# Patient Record
Sex: FEMALE | Race: WHITE | Employment: FULL TIME | URBAN - METROPOLITAN AREA
[De-identification: names, ages, dates, MRNs, and addresses within clinical notes are randomized per-mention and may not be internally consistent; named-entity substitution may affect disease eponyms.]

---

## 2017-03-04 ENCOUNTER — HOSPITAL ENCOUNTER (EMERGENCY)
Facility: HOSPITAL | Age: 37
Discharge: HOME | End: 2017-03-04
Attending: EMERGENCY MEDICINE
Payer: OTHER MISCELLANEOUS

## 2017-03-04 VITALS
DIASTOLIC BLOOD PRESSURE: 101 MMHG | RESPIRATION RATE: 18 BRPM | TEMPERATURE: 98 F | OXYGEN SATURATION: 98 % | HEART RATE: 86 BPM | BODY MASS INDEX: 26.43 KG/M2 | SYSTOLIC BLOOD PRESSURE: 141 MMHG | WEIGHT: 140 LBS | HEIGHT: 61 IN

## 2017-03-04 DIAGNOSIS — IMO0001 NEEDLE STICK INJURY, INITIAL ENCOUNTER: Primary | ICD-10-CM

## 2017-03-04 LAB
HBV SURFACE AB SER QL: REACTIVE
HBV SURFACE AB SERPL IA-ACNC: >1000 MIU/ML
HEPATITIS C VIRUS AB INTERPRETATION: NONREACTIVE
HIVS EXPOSURE ONLY: NONREACTIVE

## 2017-03-04 PROCEDURE — 99283 EMERGENCY DEPT VISIT LOW MDM: CPT

## 2017-03-04 PROCEDURE — 86803 HEPATITIS C AB TEST: CPT | Performed by: EMERGENCY MEDICINE

## 2017-03-04 PROCEDURE — 36415 COLL VENOUS BLD VENIPUNCTURE: CPT

## 2017-03-04 PROCEDURE — 86706 HEP B SURFACE ANTIBODY: CPT | Performed by: EMERGENCY MEDICINE

## 2017-03-04 PROCEDURE — 86703 HIV-1/HIV-2 1 RESULT ANTBDY: CPT | Performed by: EMERGENCY MEDICINE

## 2017-03-04 NOTE — ED INITIAL ASSESSMENT (HPI)
Pt presents to ER status post sticking herself with a needle after draining an abscess from a 14year old patient. Pt states that she believes her risk is low, but wanted an evaluation. Pt stuck herself on the right side of her head just below thumb.  Pt th

## 2017-03-04 NOTE — ED PROVIDER NOTES
Patient Seen in: [facility] Emergency Department    History   Patient presents with:  Exposure,Chem Occupational (infectious)    Stated Complaint: needle stick    HPI    48-year-old female who presents here to the emergency department after she was Alcohol Use: Yes           0.0 oz/week       0 Glasses of wine per week       Comment: pregnant      Review of Systems    Positive for stated complaint: needle stick  Other systems are as noted in HPI. Constitutional and vital signs reviewed. Medication List

## 2017-03-23 PROBLEM — F41.9 MILD ANXIETY: Status: ACTIVE | Noted: 2017-03-23

## 2017-04-05 PROBLEM — M65.4 DE QUERVAIN'S TENOSYNOVITIS, BILATERAL: Status: ACTIVE | Noted: 2017-04-05

## 2017-06-21 PROBLEM — M65.4 DE QUERVAIN'S TENOSYNOVITIS, RIGHT: Status: ACTIVE | Noted: 2017-06-21

## 2017-11-30 PROBLEM — M65.4 DE QUERVAIN'S TENOSYNOVITIS, BILATERAL: Status: RESOLVED | Noted: 2017-04-05 | Resolved: 2017-11-30

## 2017-11-30 PROBLEM — M65.4 DE QUERVAIN'S TENOSYNOVITIS, RIGHT: Status: RESOLVED | Noted: 2017-06-21 | Resolved: 2017-11-30

## 2018-08-21 PROCEDURE — 86704 HEP B CORE ANTIBODY TOTAL: CPT | Performed by: OBSTETRICS & GYNECOLOGY

## 2018-08-21 PROCEDURE — 82390 ASSAY OF CERULOPLASMIN: CPT | Performed by: OBSTETRICS & GYNECOLOGY

## 2018-08-21 PROCEDURE — 86480 TB TEST CELL IMMUN MEASURE: CPT | Performed by: PHYSICIAN ASSISTANT

## 2018-08-21 PROCEDURE — 87340 HEPATITIS B SURFACE AG IA: CPT | Performed by: OBSTETRICS & GYNECOLOGY

## 2018-08-21 PROCEDURE — 86256 FLUORESCENT ANTIBODY TITER: CPT | Performed by: INTERNAL MEDICINE

## 2018-08-21 PROCEDURE — 83516 IMMUNOASSAY NONANTIBODY: CPT | Performed by: INTERNAL MEDICINE

## 2018-08-21 PROCEDURE — 84144 ASSAY OF PROGESTERONE: CPT | Performed by: OBSTETRICS & GYNECOLOGY

## 2018-08-21 PROCEDURE — 86803 HEPATITIS C AB TEST: CPT | Performed by: OBSTETRICS & GYNECOLOGY

## 2018-11-01 PROBLEM — O09.529 ADVANCED MATERNAL AGE IN MULTIGRAVIDA, UNSPECIFIED TRIMESTER: Status: ACTIVE | Noted: 2018-11-01

## 2018-11-01 PROBLEM — O09.899 H/O PRETERM DELIVERY, CURRENTLY PREGNANT: Status: ACTIVE | Noted: 2018-11-01

## 2018-11-01 PROBLEM — Z34.81 PRENATAL CARE, SUBSEQUENT PREGNANCY, FIRST TRIMESTER: Status: ACTIVE | Noted: 2018-11-01

## 2018-11-16 PROCEDURE — 86850 RBC ANTIBODY SCREEN: CPT | Performed by: OBSTETRICS & GYNECOLOGY

## 2018-11-16 PROCEDURE — 86762 RUBELLA ANTIBODY: CPT | Performed by: OBSTETRICS & GYNECOLOGY

## 2018-11-16 PROCEDURE — 86900 BLOOD TYPING SEROLOGIC ABO: CPT | Performed by: OBSTETRICS & GYNECOLOGY

## 2018-11-16 PROCEDURE — 86780 TREPONEMA PALLIDUM: CPT | Performed by: OBSTETRICS & GYNECOLOGY

## 2018-11-16 PROCEDURE — 87389 HIV-1 AG W/HIV-1&-2 AB AG IA: CPT | Performed by: OBSTETRICS & GYNECOLOGY

## 2018-11-16 PROCEDURE — 86901 BLOOD TYPING SEROLOGIC RH(D): CPT | Performed by: OBSTETRICS & GYNECOLOGY

## 2019-01-17 NOTE — PROGRESS NOTES
Outpatient Maternal-Fetal Medicine Consultation    Dear Dr. Oliva Flores,    Thank you for requesting ultrasound evaluation and maternal fetal medicine consultation on your patient Jocelyn Montero.   As you are aware she is a 43year old female with a [place] pr Oral Tab, Take 1 tablet by mouth daily. , Disp: , Rfl:   •  doxylamine-pyridoxine 10-10 MG Oral Tab EC, Take 2 tablets by mouth daily. , Disp: 180 tablet, Rfl: 1  Allergies: No Known Allergies      PHYSICAL EXAMINATION:  /85   Pulse 82   Ht 5' 1\" (1.5 wall, gastrointestinal tract, kidneys, bladder, extremities, skeleton.     Brain: Visualized and normal appearance: brain parenchyma, cerebral ventricles, choroid plexus, Cisterna Magna, midline falx, cerebellum, cerebellar lobes, posterior fossa, vermis, c malformations  · Preeclampsia  · Gestational diabetes  · Intrauterine fetal death    As a result, enhanced pregnancy surveillance is advised for these patients including a comprehensive ultrasound to assess for fetal malformations (at 20 weeks) and a third weeks of gestation would drop the risk of fetal death from 5.2 to 1.3 per 1000 pregnancies.  While a policy of antepartum testing in older women does increase the chance that a women will be induced (71 inductions per fetal death averted) and thereby increa high-risk patients. The limitations of detailed mid-trimester sonography was reviewed with the patient. First trimester screening and second trimester multiple-marker serum serum screening as alternative aneuploidy screening options were also reviewed.  How

## 2019-01-24 ENCOUNTER — OFFICE VISIT (OUTPATIENT)
Dept: PERINATAL CARE | Facility: HOSPITAL | Age: 39
End: 2019-01-24
Attending: OBSTETRICS & GYNECOLOGY
Payer: COMMERCIAL

## 2019-01-24 VITALS
WEIGHT: 142 LBS | DIASTOLIC BLOOD PRESSURE: 85 MMHG | BODY MASS INDEX: 26.81 KG/M2 | HEART RATE: 82 BPM | HEIGHT: 61 IN | SYSTOLIC BLOOD PRESSURE: 126 MMHG

## 2019-01-24 DIAGNOSIS — O09.529 ADVANCED MATERNAL AGE IN MULTIGRAVIDA, UNSPECIFIED TRIMESTER: ICD-10-CM

## 2019-01-24 DIAGNOSIS — O09.899 H/O PRETERM DELIVERY, CURRENTLY PREGNANT: ICD-10-CM

## 2019-01-24 PROCEDURE — 76811 OB US DETAILED SNGL FETUS: CPT | Performed by: OBSTETRICS & GYNECOLOGY

## 2019-01-24 PROCEDURE — 99242 OFF/OP CONSLTJ NEW/EST SF 20: CPT | Performed by: OBSTETRICS & GYNECOLOGY

## 2019-01-24 PROCEDURE — 87086 URINE CULTURE/COLONY COUNT: CPT | Performed by: OBSTETRICS & GYNECOLOGY

## 2019-03-08 PROCEDURE — 87389 HIV-1 AG W/HIV-1&-2 AB AG IA: CPT | Performed by: OBSTETRICS & GYNECOLOGY

## 2019-03-11 PROBLEM — O24.410 DIET CONTROLLED GESTATIONAL DIABETES MELLITUS (GDM) IN THIRD TRIMESTER: Status: ACTIVE | Noted: 2019-03-11

## 2019-04-24 NOTE — PROGRESS NOTES
Outpatient Maternal-Fetal Medicine Consultation    Dear Dr. Carmella Roche    Thank you for requesting ultrasound evaluation and maternal fetal medicine consultation on your patient Jocelyn Montero.   As you are aware she is a 43year old female  with a single 1  •  escitalopram 10 MG Oral Tab, Take 1 tablet (10 mg total) by mouth daily. , Disp: 90 tablet, Rfl: 1  •  RaNITidine HCl 150 MG Oral Cap, Take 150 mg by mouth 2 (two) times daily. , Disp: , Rfl:   •  doxylamine-pyridoxine 10-10 MG Oral Tab EC, Take 2 tabl kidneys, bladder. Summary of Ultrasound Findings:  Impression: The fetal growth is appropriate    ____________________________________________________________________________  Fetal Wellbeing Assessment:  Amniotic fluid: normal. EDGARDO: 15.6 cm.  MVP: 6.2 for weekly MFM review. Insulin therapy may be started depending on her blood sugar levels.     Her capillary blood glucose records were reviewed today; her compliance with the recommended four times daily assessments (fasting and two-hour post-prandial) is

## 2019-04-25 ENCOUNTER — OFFICE VISIT (OUTPATIENT)
Dept: PERINATAL CARE | Facility: HOSPITAL | Age: 39
End: 2019-04-25
Attending: OBSTETRICS & GYNECOLOGY
Payer: COMMERCIAL

## 2019-04-25 VITALS
DIASTOLIC BLOOD PRESSURE: 85 MMHG | SYSTOLIC BLOOD PRESSURE: 130 MMHG | HEART RATE: 106 BPM | BODY MASS INDEX: 28 KG/M2 | WEIGHT: 151 LBS

## 2019-04-25 DIAGNOSIS — O24.410 DIET CONTROLLED GESTATIONAL DIABETES MELLITUS (GDM) IN THIRD TRIMESTER: ICD-10-CM

## 2019-04-25 DIAGNOSIS — O09.529 ADVANCED MATERNAL AGE IN MULTIGRAVIDA, UNSPECIFIED TRIMESTER: ICD-10-CM

## 2019-04-25 PROCEDURE — 99242 OFF/OP CONSLTJ NEW/EST SF 20: CPT | Performed by: OBSTETRICS & GYNECOLOGY

## 2019-04-25 PROCEDURE — 76805 OB US >/= 14 WKS SNGL FETUS: CPT | Performed by: OBSTETRICS & GYNECOLOGY

## 2019-04-25 PROCEDURE — 76819 FETAL BIOPHYS PROFIL W/O NST: CPT | Performed by: OBSTETRICS & GYNECOLOGY

## 2019-05-02 PROCEDURE — 87086 URINE CULTURE/COLONY COUNT: CPT | Performed by: OBSTETRICS & GYNECOLOGY

## 2019-05-16 ENCOUNTER — HOSPITAL ENCOUNTER (INPATIENT)
Facility: HOSPITAL | Age: 39
LOS: 3 days | Discharge: HOME | End: 2019-05-19
Attending: OBSTETRICS & GYNECOLOGY | Admitting: OBSTETRICS & GYNECOLOGY
Payer: COMMERCIAL

## 2019-05-16 PROBLEM — Z34.90 PREGNANCY: Status: ACTIVE | Noted: 2019-05-16

## 2019-05-16 PROCEDURE — 86901 BLOOD TYPING SEROLOGIC RH(D): CPT | Performed by: OBSTETRICS & GYNECOLOGY

## 2019-05-16 PROCEDURE — 86850 RBC ANTIBODY SCREEN: CPT | Performed by: OBSTETRICS & GYNECOLOGY

## 2019-05-16 PROCEDURE — 86780 TREPONEMA PALLIDUM: CPT | Performed by: OBSTETRICS & GYNECOLOGY

## 2019-05-16 PROCEDURE — 86900 BLOOD TYPING SEROLOGIC ABO: CPT | Performed by: OBSTETRICS & GYNECOLOGY

## 2019-05-16 PROCEDURE — 85027 COMPLETE CBC AUTOMATED: CPT | Performed by: OBSTETRICS & GYNECOLOGY

## 2019-05-16 PROCEDURE — 99214 OFFICE O/P EST MOD 30 MIN: CPT

## 2019-05-16 RX ORDER — SODIUM CHLORIDE, SODIUM LACTATE, POTASSIUM CHLORIDE, CALCIUM CHLORIDE 600; 310; 30; 20 MG/100ML; MG/100ML; MG/100ML; MG/100ML
INJECTION, SOLUTION INTRAVENOUS CONTINUOUS
Status: DISCONTINUED | OUTPATIENT
Start: 2019-05-16 | End: 2019-05-17

## 2019-05-16 RX ORDER — TERBUTALINE SULFATE 1 MG/ML
0.25 INJECTION, SOLUTION SUBCUTANEOUS AS NEEDED
Status: DISCONTINUED | OUTPATIENT
Start: 2019-05-16 | End: 2019-05-17

## 2019-05-16 RX ORDER — EPHEDRINE SULFATE/0.9% NACL/PF 25 MG/5 ML
5 SYRINGE (ML) INTRAVENOUS AS NEEDED
Status: DISCONTINUED | OUTPATIENT
Start: 2019-05-16 | End: 2019-05-17

## 2019-05-16 RX ORDER — DEXTROSE, SODIUM CHLORIDE, SODIUM LACTATE, POTASSIUM CHLORIDE, AND CALCIUM CHLORIDE 5; .6; .31; .03; .02 G/100ML; G/100ML; G/100ML; G/100ML; G/100ML
INJECTION, SOLUTION INTRAVENOUS AS NEEDED
Status: DISCONTINUED | OUTPATIENT
Start: 2019-05-16 | End: 2019-05-17

## 2019-05-16 RX ORDER — NALBUPHINE HCL 10 MG/ML
2.5 AMPUL (ML) INJECTION
Status: DISCONTINUED | OUTPATIENT
Start: 2019-05-16 | End: 2019-05-17

## 2019-05-16 RX ORDER — AMMONIA INHALANTS 0.04 G/.3ML
0.3 INHALANT RESPIRATORY (INHALATION) AS NEEDED
Status: DISCONTINUED | OUTPATIENT
Start: 2019-05-16 | End: 2019-05-17

## 2019-05-16 RX ORDER — IBUPROFEN 600 MG/1
600 TABLET ORAL ONCE AS NEEDED
Status: DISCONTINUED | OUTPATIENT
Start: 2019-05-16 | End: 2019-05-17

## 2019-05-16 RX ORDER — TRISODIUM CITRATE DIHYDRATE AND CITRIC ACID MONOHYDRATE 500; 334 MG/5ML; MG/5ML
30 SOLUTION ORAL AS NEEDED
Status: DISCONTINUED | OUTPATIENT
Start: 2019-05-16 | End: 2019-05-17

## 2019-05-17 PROCEDURE — S0028 INJECTION, FAMOTIDINE, 20 MG: HCPCS | Performed by: OBSTETRICS & GYNECOLOGY

## 2019-05-17 PROCEDURE — 0KQM0ZZ REPAIR PERINEUM MUSCLE, OPEN APPROACH: ICD-10-PCS | Performed by: OBSTETRICS & GYNECOLOGY

## 2019-05-17 RX ORDER — BISACODYL 10 MG
10 SUPPOSITORY, RECTAL RECTAL ONCE AS NEEDED
Status: ACTIVE | OUTPATIENT
Start: 2019-05-17 | End: 2019-05-17

## 2019-05-17 RX ORDER — ONDANSETRON 2 MG/ML
4 INJECTION INTRAMUSCULAR; INTRAVENOUS EVERY 6 HOURS PRN
Status: DISCONTINUED | OUTPATIENT
Start: 2019-05-17 | End: 2019-05-17

## 2019-05-17 RX ORDER — IBUPROFEN 600 MG/1
600 TABLET ORAL EVERY 6 HOURS
Status: DISCONTINUED | OUTPATIENT
Start: 2019-05-17 | End: 2019-05-19

## 2019-05-17 RX ORDER — ZOLPIDEM TARTRATE 5 MG/1
5 TABLET ORAL NIGHTLY PRN
Status: DISCONTINUED | OUTPATIENT
Start: 2019-05-17 | End: 2019-05-19

## 2019-05-17 RX ORDER — SIMETHICONE 80 MG
80 TABLET,CHEWABLE ORAL 3 TIMES DAILY PRN
Status: DISCONTINUED | OUTPATIENT
Start: 2019-05-17 | End: 2019-05-19

## 2019-05-17 RX ORDER — DOCUSATE SODIUM 100 MG/1
100 CAPSULE, LIQUID FILLED ORAL
Status: DISCONTINUED | OUTPATIENT
Start: 2019-05-17 | End: 2019-05-19

## 2019-05-17 RX ORDER — ACETAMINOPHEN 325 MG/1
650 TABLET ORAL EVERY 6 HOURS PRN
Status: DISCONTINUED | OUTPATIENT
Start: 2019-05-17 | End: 2019-05-19

## 2019-05-17 RX ORDER — FAMOTIDINE 10 MG/ML
20 INJECTION, SOLUTION INTRAVENOUS 2 TIMES DAILY
Status: DISCONTINUED | OUTPATIENT
Start: 2019-05-17 | End: 2019-05-17

## 2019-05-17 NOTE — PROGRESS NOTES
SUBJECTIVE:   pt without complaints. Comfortable s/p epidural    OBJECTIVE:  VS:  height is 5' 1\" (1.549 m) and weight is 152 lb (68.9 kg). Her temporal temperature is 97.9 °F (36.6 °C). Her blood pressure is 113/74 and her pulse is 91.  Her respiration i

## 2019-05-17 NOTE — PLAN OF CARE
Problem: SAFETY ADULT - FALL  Goal: Free from fall injury  Description  INTERVENTIONS:  - Assess pt frequently for physical needs  - Identify cognitive and physical deficits and behaviors that affect risk of falls.   - [place] fall precautions as indica previous experience with breast feeding.  - Provide information as needed about early infant feeding cues (e.g., rooting, lip smacking, sucking fingers/hand) versus late cue of crying.  - Discuss/demonstrate breast feeding aids (e.g., infant sling, nursing

## 2019-05-17 NOTE — PROGRESS NOTES
Patient arrived via    to Mother/Baby unit. Pt transferred to bed. Oriented to surroundings. Call light within reach. Siderails up x2. Baby bands verified and correct. Hugs & Kisses already on and explained. .  Baby in room with christin

## 2019-05-17 NOTE — PLAN OF CARE
Problem: SAFETY ADULT - FALL  Goal: Free from fall injury  Description  INTERVENTIONS:  - Assess pt frequently for physical needs  - Identify cognitive and physical deficits and behaviors that affect risk of falls.   - [place] fall precautions as indica

## 2019-05-17 NOTE — L&D DELIVERY NOTE
Aranza Clayton, Girl Thyra Oh [[id number]]    Labor Events     labor?:  Yes   steroids?:  None  Antibiotics received during labor?:  Yes  Antibiotics (enter # doses in comment):  ampicillin  Rupture date/time:  2019 0703     Rupture type:  AROM, I withdrawal    Muscle tone Limp Some flexion Active motion    Respiratory effort Absent Weak cry; hypoventilation Good, crying              1 Minute:   5 Minute:   10 Minute:   15 Minute:   20 Minute:     Skin color: 0  1       Heart rate: 2  2       Reflex

## 2019-05-17 NOTE — H&P
[address] and Delivery Prenatal History and Physical Interval Addendum  Please see full Prenatal Record for this pregnancy      SUBJECTIVE:    Interval History: This is a pregnancy at 36w2d weeks admitted for painful contractions in labor.

## 2019-05-18 PROCEDURE — 85025 COMPLETE CBC W/AUTO DIFF WBC: CPT | Performed by: OBSTETRICS & GYNECOLOGY

## 2019-05-18 RX ORDER — ESCITALOPRAM OXALATE 10 MG/1
10 TABLET ORAL DAILY
Status: DISCONTINUED | OUTPATIENT
Start: 2019-05-18 | End: 2019-05-19

## 2019-05-18 RX ORDER — RANITIDINE 15 MG/ML
150 SOLUTION ORAL DAILY
Status: DISCONTINUED | OUTPATIENT
Start: 2019-05-18 | End: 2019-05-19

## 2019-05-18 NOTE — PLAN OF CARE
Problem: SAFETY ADULT - FALL  Goal: Free from fall injury  Description  INTERVENTIONS:  - Assess pt frequently for physical needs  - Identify cognitive and physical deficits and behaviors that affect risk of falls.   - [place] fall precautions as indica previous experience with breast feeding.  - Provide information as needed about early infant feeding cues (e.g., rooting, lip smacking, sucking fingers/hand) versus late cue of crying.  - Discuss/demonstrate breast feeding aids (e.g., infant sling, nursing

## 2019-05-18 NOTE — PROGRESS NOTES
Postpartum Day 1    Pt without complaints.     Temp: 98.6 °F (37 °C)  Pulse: 86  Resp: 16  BP: 125/99  abd  soft, NT, ND, fundus firm below umbilicus  perineum NL lochia  extr  trace edema, no calf tenderness  Recent Labs   Lab 05/18/19  0802   RBC 3.83   H

## 2019-05-19 VITALS
HEART RATE: 76 BPM | TEMPERATURE: 98 F | SYSTOLIC BLOOD PRESSURE: 128 MMHG | WEIGHT: 152 LBS | OXYGEN SATURATION: 100 % | HEIGHT: 61 IN | DIASTOLIC BLOOD PRESSURE: 93 MMHG | RESPIRATION RATE: 16 BRPM | BODY MASS INDEX: 28.7 KG/M2

## 2019-05-19 NOTE — PROGRESS NOTES
NURSING DISCHARGE NOTE    Discharged Home via ambulatory. Accompanied by Support staff  Belongings Taken by patient/family. Discharge instructions reviewed, pt states understanding.

## 2019-05-19 NOTE — PROGRESS NOTES
Postpartum Day 2    Pt without complaints.     Temp: 98.3 °F (36.8 °C)  Pulse: 76  Resp: 16  BP: 128/93  abd  soft, NT, ND, fundus firm below umbilicus  perineum NL lochia  extr  trace edema, no calf tenderness    Impression: PPD#2, h/o GDM  Plan:  Home tod

## 2019-05-19 NOTE — PLAN OF CARE
Problem: SAFETY ADULT - FALL  Goal: Free from fall injury  Description  INTERVENTIONS:  - Assess pt frequently for physical needs  - Identify cognitive and physical deficits and behaviors that affect risk of falls.   - [place] fall precautions as indica previous experience with breast feeding.  - Provide information as needed about early infant feeding cues (e.g., rooting, lip smacking, sucking fingers/hand) versus late cue of crying.  - Discuss/demonstrate breast feeding aids (e.g., infant sling, nursing

## 2019-05-21 ENCOUNTER — TELEPHONE (OUTPATIENT)
Dept: OBGYN UNIT | Facility: HOSPITAL | Age: 39
End: 2019-05-21

## 2019-08-28 PROBLEM — Z34.90 PREGNANCY: Status: RESOLVED | Noted: 2019-05-16 | Resolved: 2019-08-28

## 2019-08-28 PROBLEM — O09.529 ADVANCED MATERNAL AGE IN MULTIGRAVIDA, UNSPECIFIED TRIMESTER: Status: RESOLVED | Noted: 2018-11-01 | Resolved: 2019-08-28

## 2019-08-28 PROBLEM — Z34.81 PRENATAL CARE, SUBSEQUENT PREGNANCY, FIRST TRIMESTER: Status: RESOLVED | Noted: 2018-11-01 | Resolved: 2019-08-28

## 2019-08-28 PROBLEM — O09.899 H/O PRETERM DELIVERY, CURRENTLY PREGNANT: Status: RESOLVED | Noted: 2018-11-01 | Resolved: 2019-08-28

## 2020-03-12 ENCOUNTER — APPOINTMENT (OUTPATIENT)
Dept: OTHER | Facility: HOSPITAL | Age: 40
End: 2020-03-12
Attending: PREVENTIVE MEDICINE

## 2020-12-19 ENCOUNTER — IMMUNIZATION (OUTPATIENT)
Dept: LAB | Facility: HOSPITAL | Age: 40
End: 2020-12-19
Attending: PREVENTIVE MEDICINE
Payer: COMMERCIAL

## 2020-12-19 DIAGNOSIS — Z23 NEED FOR VACCINATION: ICD-10-CM

## 2020-12-19 PROCEDURE — 0001A PFIZER-BIONTECH COVID-19 VACCINE: CPT

## 2021-01-09 ENCOUNTER — IMMUNIZATION (OUTPATIENT)
Dept: LAB | Facility: HOSPITAL | Age: 41
End: 2021-01-09
Attending: PREVENTIVE MEDICINE
Payer: COMMERCIAL

## 2021-01-09 DIAGNOSIS — Z23 NEED FOR VACCINATION: ICD-10-CM

## 2021-01-09 PROCEDURE — 0002A SARSCOV2 VAC 30MCG/0.3ML IM: CPT

## 2021-03-10 ENCOUNTER — LAB REQUISITION (OUTPATIENT)
Dept: LAB | Facility: HOSPITAL | Age: 41
End: 2021-03-10
Payer: COMMERCIAL

## 2021-03-10 DIAGNOSIS — R22.32 LOCALIZED SWELLING, MASS AND LUMP, LEFT UPPER LIMB: ICD-10-CM

## 2021-03-10 PROCEDURE — 88304 TISSUE EXAM BY PATHOLOGIST: CPT | Performed by: ORTHOPAEDIC SURGERY

## 2023-01-05 ENCOUNTER — APPOINTMENT (OUTPATIENT)
Dept: URBAN - METROPOLITAN AREA CLINIC 208 | Age: 43
Setting detail: DERMATOLOGY
End: 2023-01-12

## 2023-01-05 DIAGNOSIS — L30.1 DYSHIDROSIS [POMPHOLYX]: ICD-10-CM

## 2023-01-05 PROCEDURE — OTHER PRESCRIPTION: OTHER

## 2023-01-05 PROCEDURE — OTHER MIPS QUALITY: OTHER

## 2023-01-05 PROCEDURE — OTHER COUNSELING: OTHER

## 2023-01-05 PROCEDURE — 99204 OFFICE O/P NEW MOD 45 MIN: CPT

## 2023-01-05 PROCEDURE — OTHER PRESCRIPTION SAMPLES PROVIDED: OTHER

## 2023-01-05 PROCEDURE — OTHER PATIENT SPECIFIC COUNSELING: OTHER

## 2023-01-05 RX ORDER — TACROLIMUS 1 MG/G
OINTMENT TOPICAL
Qty: 60 | Refills: 3 | Status: ERX | COMMUNITY
Start: 2023-01-05

## 2023-01-05 RX ORDER — HALOBETASOL PROPIONATE 0.1 MG/G
LOTION TOPICAL
Qty: 60 | Refills: 1 | Status: ERX | COMMUNITY
Start: 2023-01-05

## 2023-01-05 ASSESSMENT — LOCATION DETAILED DESCRIPTION DERM
LOCATION DETAILED: RIGHT RADIAL PALM
LOCATION DETAILED: LEFT RADIAL PALM
LOCATION DETAILED: RIGHT MEDIAL PLANTAR MIDFOOT

## 2023-01-05 ASSESSMENT — LOCATION SIMPLE DESCRIPTION DERM
LOCATION SIMPLE: RIGHT HAND
LOCATION SIMPLE: RIGHT PLANTAR SURFACE
LOCATION SIMPLE: LEFT HAND

## 2023-01-05 ASSESSMENT — LOCATION ZONE DERM
LOCATION ZONE: HAND
LOCATION ZONE: FEET

## 2023-01-05 NOTE — PROCEDURE: MIPS QUALITY
Quality 130: Documentation Of Current Medications In The Medical Record: Current Medications Documented
Detail Level: Generalized
Quality 110: Preventive Care And Screening: Influenza Immunization: Influenza Immunization Administered during Influenza season
Quality 226: Preventive Care And Screening: Tobacco Use: Screening And Cessation Intervention: Tobacco Screening not Performed for Medical Reasons

## 2023-01-05 NOTE — HPI: RASH
What Type Of Note Output Would You Prefer (Optional)?: Bullet Format
How Severe Is Your Rash?: moderate
Is This A New Presentation, Or A Follow-Up?: Rash
Additional History: Pt states she has tried TMC with some improvement but rash came back\\nTried Mometasone with much improvement, rash returned eventually and lastly Clobetasol, not sure if it helped much.\\nShe moisturizes with Aquaphor or CeraVe. 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

## 2023-01-19 ENCOUNTER — RX ONLY (RX ONLY)
Age: 43
End: 2023-01-19

## 2023-01-19 RX ORDER — TACROLIMUS 1 MG/G
OINTMENT TOPICAL
Qty: 60 | Refills: 3 | Status: ERX

## (undated) NOTE — ED AVS SNAPSHOT
[facility] Emergency Department    [place]  [address] [address]    Phone:  [phone]    Fax:  [phone]           Jose Francisco Avalos   MRN: [id number]    Department:  [facility] Emergency Department   Date of Visit:  3/4/2 If you have any problems with your follow-up, please call our  at [phone]    Si usted tiene algun problema con burns sequimiento, por favor llame a nuestro adminstrador de casos al [phone]    Expect to receive an electronic request Jazzy Rocha [address] [facility] ([address]) [address] [facility]. ([address]) [address]  ([address]) [address] [facility] Summaries. If you've been to the Emergency Department or your doctor's office, you can view your past visit information in [employer] by going to Visits < Visit Summaries. [employer] questions? Call [phone] for help.   [employer] is NOT to be used for urge

## (undated) NOTE — ED AVS SNAPSHOT
[facility] Emergency Department    [place]  [address] [address]    Phone:  [phone]    Fax:  [phone]           Gregg Prak   MRN: [id number]    Department:  [facility] Emergency Department   Date of Visit:  3/4/2 IF THERE IS ANY CHANGE OR WORSENING OF YOUR CONDITION, CALL YOUR PRIMARY CARE PHYSICIAN AT ONCE OR RETURN IMMEDIATELY TO THE EMERGENCY DEPARTMENT.     If you have been prescribed any medication(s), please fill your prescription right away and begin taking t